# Patient Record
Sex: MALE | Race: WHITE | NOT HISPANIC OR LATINO | Employment: UNEMPLOYED | URBAN - METROPOLITAN AREA
[De-identification: names, ages, dates, MRNs, and addresses within clinical notes are randomized per-mention and may not be internally consistent; named-entity substitution may affect disease eponyms.]

---

## 2019-01-22 ENCOUNTER — OFFICE VISIT (OUTPATIENT)
Dept: ORTHOPEDICS | Facility: CLINIC | Age: 39
End: 2019-01-22

## 2019-01-22 ENCOUNTER — HOSPITAL ENCOUNTER (OUTPATIENT)
Dept: RADIOLOGY | Facility: HOSPITAL | Age: 39
Discharge: HOME | End: 2019-01-22
Attending: ORTHOPAEDIC SURGERY

## 2019-01-22 VITALS
HEIGHT: 74 IN | BODY MASS INDEX: 28.23 KG/M2 | SYSTOLIC BLOOD PRESSURE: 130 MMHG | DIASTOLIC BLOOD PRESSURE: 77 MMHG | HEART RATE: 85 BPM | WEIGHT: 220 LBS

## 2019-01-22 DIAGNOSIS — M25.572 LEFT ANKLE PAIN, UNSPECIFIED CHRONICITY: Primary | ICD-10-CM

## 2019-01-22 DIAGNOSIS — S93.492A SPRAIN OF ANTERIOR TALOFIBULAR LIGAMENT OF LEFT ANKLE, INITIAL ENCOUNTER: ICD-10-CM

## 2019-01-22 DIAGNOSIS — M25.572 LEFT ANKLE PAIN, UNSPECIFIED CHRONICITY: ICD-10-CM

## 2019-01-22 PROCEDURE — 73610 X-RAY EXAM OF ANKLE: CPT | Mod: 26,LT,, | Performed by: RADIOLOGY

## 2019-01-22 PROCEDURE — 99203 OFFICE O/P NEW LOW 30 MIN: CPT | Mod: PBBFAC,25,PN | Performed by: ORTHOPAEDIC SURGERY

## 2019-01-22 PROCEDURE — 97760 ORTHOTIC MGMT&TRAING 1ST ENC: CPT | Mod: ,,, | Performed by: ORTHOPAEDIC SURGERY

## 2019-01-22 PROCEDURE — 97760 PR ORTHOTIC MGMT&TRAINJ INITIAL ENC EA 15 MINS: ICD-10-PCS | Mod: ,,, | Performed by: ORTHOPAEDIC SURGERY

## 2019-01-22 PROCEDURE — 99999 PR PBB SHADOW E&M-NEW PATIENT-LVL III: CPT | Mod: PBBFAC,,, | Performed by: ORTHOPAEDIC SURGERY

## 2019-01-22 PROCEDURE — 99203 OFFICE O/P NEW LOW 30 MIN: CPT | Mod: S$PBB,,, | Performed by: ORTHOPAEDIC SURGERY

## 2019-01-22 PROCEDURE — 99203 PR OFFICE/OUTPT VISIT, NEW, LEVL III, 30-44 MIN: ICD-10-PCS | Mod: S$PBB,,, | Performed by: ORTHOPAEDIC SURGERY

## 2019-01-22 PROCEDURE — 99999 PR PBB SHADOW E&M-NEW PATIENT-LVL III: ICD-10-PCS | Mod: PBBFAC,,, | Performed by: ORTHOPAEDIC SURGERY

## 2019-01-22 PROCEDURE — 73610 X-RAY EXAM OF ANKLE: CPT | Mod: TC,PO,LT

## 2019-01-22 PROCEDURE — 73610 XR ANKLE COMPLETE 3 VIEW LEFT: ICD-10-PCS | Mod: 26,LT,, | Performed by: RADIOLOGY

## 2019-01-22 RX ORDER — CITALOPRAM 40 MG/1
TABLET, FILM COATED ORAL
Refills: 0 | COMMUNITY
Start: 2018-12-03

## 2019-01-22 RX ORDER — CLONAZEPAM 2 MG/1
TABLET ORAL
Refills: 0 | COMMUNITY
Start: 2019-01-08 | End: 2022-11-01

## 2019-01-22 RX ORDER — TRAZODONE HYDROCHLORIDE 50 MG/1
TABLET ORAL
Refills: 0 | COMMUNITY
Start: 2019-01-08 | End: 2022-11-01

## 2019-01-22 RX ORDER — CLONAZEPAM 1 MG/1
TABLET ORAL
Refills: 0 | COMMUNITY
Start: 2018-10-16 | End: 2022-11-01

## 2019-01-22 RX ORDER — GABAPENTIN 800 MG/1
TABLET ORAL
Refills: 4 | COMMUNITY
Start: 2019-01-08 | End: 2022-11-01

## 2019-01-22 RX ORDER — HYDROCODONE BITARTRATE AND ACETAMINOPHEN 5; 325 MG/1; MG/1
TABLET ORAL
Refills: 0 | COMMUNITY
Start: 2019-01-17 | End: 2022-11-01

## 2019-01-22 RX ORDER — IBUPROFEN 800 MG/1
800 TABLET ORAL 3 TIMES DAILY
Qty: 60 TABLET | Refills: 3 | Status: SHIPPED | OUTPATIENT
Start: 2019-01-22

## 2019-01-22 RX ORDER — BUPRENORPHINE HYDROCHLORIDE 8 MG/1
TABLET SUBLINGUAL
Refills: 0 | COMMUNITY
Start: 2019-01-01 | End: 2019-01-22
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
## 2019-02-11 DIAGNOSIS — S93.492A SPRAIN OF ANTERIOR TALOFIBULAR LIGAMENT OF LEFT ANKLE, INITIAL ENCOUNTER: Primary | ICD-10-CM

## 2019-03-20 ENCOUNTER — CLINICAL SUPPORT (OUTPATIENT)
Dept: URGENT CARE | Facility: CLINIC | Age: 39
End: 2019-03-20

## 2019-03-20 DIAGNOSIS — Z02.89 ENCOUNTER FOR PHYSICAL EXAMINATION RELATED TO EMPLOYMENT: Primary | ICD-10-CM

## 2019-04-10 NOTE — PROGRESS NOTES
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

## 2022-11-01 ENCOUNTER — OFFICE VISIT (OUTPATIENT)
Dept: FAMILY MEDICINE | Facility: CLINIC | Age: 42
End: 2022-11-01
Payer: COMMERCIAL

## 2022-11-01 VITALS
DIASTOLIC BLOOD PRESSURE: 84 MMHG | HEART RATE: 89 BPM | OXYGEN SATURATION: 95 % | HEIGHT: 74 IN | TEMPERATURE: 98 F | WEIGHT: 266.31 LBS | BODY MASS INDEX: 34.18 KG/M2 | SYSTOLIC BLOOD PRESSURE: 126 MMHG

## 2022-11-01 DIAGNOSIS — G47.30 SLEEP-RELATED BREATHING DISORDER: ICD-10-CM

## 2022-11-01 DIAGNOSIS — Z00.00 HEALTHCARE MAINTENANCE: Primary | ICD-10-CM

## 2022-11-01 DIAGNOSIS — R39.12 WEAK URINARY STREAM: ICD-10-CM

## 2022-11-01 PROCEDURE — 3079F DIAST BP 80-89 MM HG: CPT | Mod: CPTII,S$GLB,, | Performed by: INTERNAL MEDICINE

## 2022-11-01 PROCEDURE — 1160F PR REVIEW ALL MEDS BY PRESCRIBER/CLIN PHARMACIST DOCUMENTED: ICD-10-PCS | Mod: CPTII,S$GLB,, | Performed by: INTERNAL MEDICINE

## 2022-11-01 PROCEDURE — 1159F PR MEDICATION LIST DOCUMENTED IN MEDICAL RECORD: ICD-10-PCS | Mod: CPTII,S$GLB,, | Performed by: INTERNAL MEDICINE

## 2022-11-01 PROCEDURE — 1160F RVW MEDS BY RX/DR IN RCRD: CPT | Mod: CPTII,S$GLB,, | Performed by: INTERNAL MEDICINE

## 2022-11-01 PROCEDURE — 99204 PR OFFICE/OUTPT VISIT, NEW, LEVL IV, 45-59 MIN: ICD-10-PCS | Mod: S$GLB,,, | Performed by: INTERNAL MEDICINE

## 2022-11-01 PROCEDURE — 3074F PR MOST RECENT SYSTOLIC BLOOD PRESSURE < 130 MM HG: ICD-10-PCS | Mod: CPTII,S$GLB,, | Performed by: INTERNAL MEDICINE

## 2022-11-01 PROCEDURE — 99999 PR PBB SHADOW E&M-NEW PATIENT-LVL V: CPT | Mod: PBBFAC,,, | Performed by: INTERNAL MEDICINE

## 2022-11-01 PROCEDURE — 3074F SYST BP LT 130 MM HG: CPT | Mod: CPTII,S$GLB,, | Performed by: INTERNAL MEDICINE

## 2022-11-01 PROCEDURE — 99204 OFFICE O/P NEW MOD 45 MIN: CPT | Mod: S$GLB,,, | Performed by: INTERNAL MEDICINE

## 2022-11-01 PROCEDURE — 1159F MED LIST DOCD IN RCRD: CPT | Mod: CPTII,S$GLB,, | Performed by: INTERNAL MEDICINE

## 2022-11-01 PROCEDURE — 3079F PR MOST RECENT DIASTOLIC BLOOD PRESSURE 80-89 MM HG: ICD-10-PCS | Mod: CPTII,S$GLB,, | Performed by: INTERNAL MEDICINE

## 2022-11-01 PROCEDURE — 99999 PR PBB SHADOW E&M-NEW PATIENT-LVL V: ICD-10-PCS | Mod: PBBFAC,,, | Performed by: INTERNAL MEDICINE

## 2022-11-01 RX ORDER — BUPRENORPHINE HYDROCHLORIDE 8 MG/1
8 TABLET SUBLINGUAL 3 TIMES DAILY
COMMUNITY
Start: 2022-10-28

## 2022-11-01 RX ORDER — IBUPROFEN 100 MG/5ML
1000 SUSPENSION, ORAL (FINAL DOSE FORM) ORAL DAILY
COMMUNITY

## 2022-11-01 RX ORDER — ZINC GLUCONATE 50 MG
50 TABLET ORAL DAILY
COMMUNITY

## 2022-11-01 RX ORDER — OMEPRAZOLE 20 MG/1
20 CAPSULE, DELAYED RELEASE ORAL DAILY
COMMUNITY
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
## 2022-11-02 ENCOUNTER — LAB VISIT (OUTPATIENT)
Dept: LAB | Facility: HOSPITAL | Age: 42
End: 2022-11-02
Attending: INTERNAL MEDICINE
Payer: COMMERCIAL

## 2022-11-02 DIAGNOSIS — Z00.00 HEALTHCARE MAINTENANCE: ICD-10-CM

## 2022-11-02 LAB
25(OH)D3+25(OH)D2 SERPL-MCNC: 30 NG/ML (ref 30–96)
ALBUMIN SERPL BCP-MCNC: 4.6 G/DL (ref 3.5–5.2)
ALP SERPL-CCNC: 54 U/L (ref 55–135)
ALT SERPL W/O P-5'-P-CCNC: 41 U/L (ref 10–44)
ANION GAP SERPL CALC-SCNC: 10 MMOL/L (ref 8–16)
AST SERPL-CCNC: 27 U/L (ref 10–40)
BASOPHILS # BLD AUTO: 0.03 K/UL (ref 0–0.2)
BASOPHILS NFR BLD: 0.5 % (ref 0–1.9)
BILIRUB SERPL-MCNC: 1.1 MG/DL (ref 0.1–1)
BUN SERPL-MCNC: 21 MG/DL (ref 6–20)
CALCIUM SERPL-MCNC: 9.5 MG/DL (ref 8.7–10.5)
CHLORIDE SERPL-SCNC: 102 MMOL/L (ref 95–110)
CHOLEST SERPL-MCNC: 193 MG/DL (ref 120–199)
CHOLEST/HDLC SERPL: 4.7 {RATIO} (ref 2–5)
CO2 SERPL-SCNC: 30 MMOL/L (ref 23–29)
CREAT SERPL-MCNC: 0.8 MG/DL (ref 0.5–1.4)
DIFFERENTIAL METHOD: NORMAL
EOSINOPHIL # BLD AUTO: 0.1 K/UL (ref 0–0.5)
EOSINOPHIL NFR BLD: 1.4 % (ref 0–8)
ERYTHROCYTE [DISTWIDTH] IN BLOOD BY AUTOMATED COUNT: 11.7 % (ref 11.5–14.5)
EST. GFR  (NO RACE VARIABLE): >60 ML/MIN/1.73 M^2
ESTIMATED AVG GLUCOSE: 100 MG/DL (ref 68–131)
GLUCOSE SERPL-MCNC: 109 MG/DL (ref 70–110)
HAV IGM SERPL QL IA: NORMAL
HBA1C MFR BLD: 5.1 % (ref 4–5.6)
HBV CORE IGM SERPL QL IA: NORMAL
HBV SURFACE AG SERPL QL IA: NORMAL
HCT VFR BLD AUTO: 41 % (ref 40–54)
HCV AB SERPL QL IA: NORMAL
HCV AB SERPL QL IA: NORMAL
HDLC SERPL-MCNC: 41 MG/DL (ref 40–75)
HDLC SERPL: 21.2 % (ref 20–50)
HGB BLD-MCNC: 14.4 G/DL (ref 14–18)
HIV 1+2 AB+HIV1 P24 AG SERPL QL IA: NORMAL
IMM GRANULOCYTES # BLD AUTO: 0.02 K/UL (ref 0–0.04)
IMM GRANULOCYTES NFR BLD AUTO: 0.4 % (ref 0–0.5)
LDLC SERPL CALC-MCNC: 113.4 MG/DL (ref 63–159)
LYMPHOCYTES # BLD AUTO: 1.3 K/UL (ref 1–4.8)
LYMPHOCYTES NFR BLD: 23.6 % (ref 18–48)
MCH RBC QN AUTO: 30.7 PG (ref 27–31)
MCHC RBC AUTO-ENTMCNC: 35.1 G/DL (ref 32–36)
MCV RBC AUTO: 87 FL (ref 82–98)
MONOCYTES # BLD AUTO: 0.3 K/UL (ref 0.3–1)
MONOCYTES NFR BLD: 5.8 % (ref 4–15)
NEUTROPHILS # BLD AUTO: 3.8 K/UL (ref 1.8–7.7)
NEUTROPHILS NFR BLD: 68.3 % (ref 38–73)
NONHDLC SERPL-MCNC: 152 MG/DL
NRBC BLD-RTO: 0 /100 WBC
PLATELET # BLD AUTO: 195 K/UL (ref 150–450)
PMV BLD AUTO: 11.3 FL (ref 9.2–12.9)
POTASSIUM SERPL-SCNC: 4.1 MMOL/L (ref 3.5–5.1)
PROT SERPL-MCNC: 8.1 G/DL (ref 6–8.4)
RBC # BLD AUTO: 4.69 M/UL (ref 4.6–6.2)
SODIUM SERPL-SCNC: 142 MMOL/L (ref 136–145)
TRIGL SERPL-MCNC: 193 MG/DL (ref 30–150)
TSH SERPL DL<=0.005 MIU/L-ACNC: 2.78 UIU/ML (ref 0.4–4)
WBC # BLD AUTO: 5.56 K/UL (ref 3.9–12.7)

## 2022-11-02 PROCEDURE — 80061 LIPID PANEL: CPT | Performed by: INTERNAL MEDICINE

## 2022-11-02 PROCEDURE — 80074 ACUTE HEPATITIS PANEL: CPT | Performed by: INTERNAL MEDICINE

## 2022-11-02 PROCEDURE — 87389 HIV-1 AG W/HIV-1&-2 AB AG IA: CPT | Performed by: INTERNAL MEDICINE

## 2022-11-02 PROCEDURE — 80053 COMPREHEN METABOLIC PANEL: CPT | Performed by: INTERNAL MEDICINE

## 2022-11-02 PROCEDURE — 85025 COMPLETE CBC W/AUTO DIFF WBC: CPT | Performed by: INTERNAL MEDICINE

## 2022-11-02 PROCEDURE — 82306 VITAMIN D 25 HYDROXY: CPT | Performed by: INTERNAL MEDICINE

## 2022-11-02 PROCEDURE — 36415 COLL VENOUS BLD VENIPUNCTURE: CPT | Mod: PN | Performed by: INTERNAL MEDICINE

## 2022-11-02 PROCEDURE — 84443 ASSAY THYROID STIM HORMONE: CPT | Performed by: INTERNAL MEDICINE

## 2022-11-02 PROCEDURE — 86592 SYPHILIS TEST NON-TREP QUAL: CPT | Performed by: INTERNAL MEDICINE

## 2022-11-02 PROCEDURE — 83036 HEMOGLOBIN GLYCOSYLATED A1C: CPT | Performed by: INTERNAL MEDICINE

## 2022-11-03 ENCOUNTER — TELEPHONE (OUTPATIENT)
Dept: FAMILY MEDICINE | Facility: CLINIC | Age: 42
End: 2022-11-03
Payer: COMMERCIAL

## 2022-11-03 LAB — RPR SER QL: NORMAL

## 2022-11-03 NOTE — TELEPHONE ENCOUNTER
----- Message from Higinio Stokes MD sent at 11/3/2022 10:27 AM CDT -----  Please call patient to notify him that urine culture is without growth of bacteria so far.  Kidney and liver test normal.  There is no evidence of diabetes as A1C lab in normal.  Gonorrhea/chlamydia negative.  Urine test without major abnormalities.  Cholesterol was mildly elevated.  Work on healthy eating, regular exercise, and eating enough fiber to manage this.  Other results are normal.    We can discuss in more detail at his next visit.

## 2023-02-14 ENCOUNTER — OFFICE VISIT (OUTPATIENT)
Dept: PULMONOLOGY | Facility: CLINIC | Age: 43
End: 2023-02-14
Payer: COMMERCIAL

## 2023-02-14 VITALS
WEIGHT: 278.25 LBS | OXYGEN SATURATION: 97 % | HEART RATE: 86 BPM | BODY MASS INDEX: 35.71 KG/M2 | SYSTOLIC BLOOD PRESSURE: 147 MMHG | DIASTOLIC BLOOD PRESSURE: 101 MMHG | HEIGHT: 74 IN

## 2023-02-14 DIAGNOSIS — G47.30 SLEEP-RELATED BREATHING DISORDER: ICD-10-CM

## 2023-02-14 DIAGNOSIS — G47.33 OSA (OBSTRUCTIVE SLEEP APNEA): ICD-10-CM

## 2023-02-14 DIAGNOSIS — R09.81 NASAL CONGESTION: ICD-10-CM

## 2023-02-14 PROCEDURE — 99203 PR OFFICE/OUTPT VISIT, NEW, LEVL III, 30-44 MIN: ICD-10-PCS | Mod: S$GLB,,, | Performed by: INTERNAL MEDICINE

## 2023-02-14 PROCEDURE — 3077F SYST BP >= 140 MM HG: CPT | Mod: CPTII,S$GLB,, | Performed by: INTERNAL MEDICINE

## 2023-02-14 PROCEDURE — 3077F PR MOST RECENT SYSTOLIC BLOOD PRESSURE >= 140 MM HG: ICD-10-PCS | Mod: CPTII,S$GLB,, | Performed by: INTERNAL MEDICINE

## 2023-02-14 PROCEDURE — 3080F PR MOST RECENT DIASTOLIC BLOOD PRESSURE >= 90 MM HG: ICD-10-PCS | Mod: CPTII,S$GLB,, | Performed by: INTERNAL MEDICINE

## 2023-02-14 PROCEDURE — 1159F MED LIST DOCD IN RCRD: CPT | Mod: CPTII,S$GLB,, | Performed by: INTERNAL MEDICINE

## 2023-02-14 PROCEDURE — 3008F BODY MASS INDEX DOCD: CPT | Mod: CPTII,S$GLB,, | Performed by: INTERNAL MEDICINE

## 2023-02-14 PROCEDURE — 1159F PR MEDICATION LIST DOCUMENTED IN MEDICAL RECORD: ICD-10-PCS | Mod: CPTII,S$GLB,, | Performed by: INTERNAL MEDICINE

## 2023-02-14 PROCEDURE — 3008F PR BODY MASS INDEX (BMI) DOCUMENTED: ICD-10-PCS | Mod: CPTII,S$GLB,, | Performed by: INTERNAL MEDICINE

## 2023-02-14 PROCEDURE — 99999 PR PBB SHADOW E&M-EST. PATIENT-LVL V: ICD-10-PCS | Mod: PBBFAC,,, | Performed by: INTERNAL MEDICINE

## 2023-02-14 PROCEDURE — 3080F DIAST BP >= 90 MM HG: CPT | Mod: CPTII,S$GLB,, | Performed by: INTERNAL MEDICINE

## 2023-02-14 PROCEDURE — 99203 OFFICE O/P NEW LOW 30 MIN: CPT | Mod: S$GLB,,, | Performed by: INTERNAL MEDICINE

## 2023-02-14 PROCEDURE — 99999 PR PBB SHADOW E&M-EST. PATIENT-LVL V: CPT | Mod: PBBFAC,,, | Performed by: INTERNAL MEDICINE

## 2023-02-14 NOTE — PATIENT INSTRUCTIONS
Your provider has scheduled you a Sleep Study    What you need to know:    This referral will be sent to your insurance for authorization.  Depending on your insurance company, this process may take two weeks to be authorized.    Once your study has been authorized, Some one from the sleep lab will contact you to schedule your sleep study.   Their number is [phone]. The [facility] is located on 2nd floor of [facility].    We will call you when the sleep study results are ready - if you have not heard from us by 2 weeks from the date of the study, please call [phone].    For information regarding financial obligations, please call [employer] at [phone].    If you study is already scheduled, please call [phone].    Once your study has been completed, it will take up to 14 business days for the results to be sent back to your provider.    If you have any questions you can send a message through your [facility] account, or call the clinic at [phone].    You are advised to abstain from driving should you feel sleepy or drowsy.  1.  NeilMed Sinus Rinse Regular Kit    Recipe 1/4 teaspoon of salt and 1/4 teaspoon of baking soda in distilled water.  Be sure to tilt head forward as shown in picture.          flonase or nasonex over the counter.  2 sprays per nostril daily. Be sure to tilt head forward when dispensing medication.

## 2023-02-14 NOTE — ASSESSMENT & PLAN NOTE
The patient symptomatically has loud snoring, daytime somnolence with findings of enlarged neck, high grade mallampatti. This warrants further investigation for possible obstructive sleep apnea.  Patient will be contacted after sleep study is done.
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
## 2024-01-16 ENCOUNTER — HOSPITAL ENCOUNTER (EMERGENCY)
Facility: HOSPITAL | Age: 44
Discharge: HOME | End: 2024-01-16
Attending: EMERGENCY MEDICINE
Payer: COMMERCIAL

## 2024-01-16 VITALS
RESPIRATION RATE: 16 BRPM | DIASTOLIC BLOOD PRESSURE: 63 MMHG | HEIGHT: 74 IN | HEART RATE: 74 BPM | TEMPERATURE: 98 F | OXYGEN SATURATION: 96 % | WEIGHT: 275 LBS | SYSTOLIC BLOOD PRESSURE: 134 MMHG | BODY MASS INDEX: 35.29 KG/M2

## 2024-01-16 DIAGNOSIS — F41.9 ANXIETY: Primary | ICD-10-CM

## 2024-01-16 DIAGNOSIS — R06.02 SOB (SHORTNESS OF BREATH): ICD-10-CM

## 2024-01-16 LAB
ALBUMIN SERPL BCP-MCNC: 4.4 G/DL (ref 3.5–5.2)
ALP SERPL-CCNC: 58 U/L (ref 55–135)
ALT SERPL W/O P-5'-P-CCNC: 93 U/L (ref 10–44)
ANION GAP SERPL CALC-SCNC: 7 MMOL/L (ref 8–16)
AST SERPL-CCNC: 45 U/L (ref 10–40)
BASOPHILS # BLD AUTO: 0.02 K/UL (ref 0–0.2)
BASOPHILS NFR BLD: 0.3 % (ref 0–1.9)
BILIRUB SERPL-MCNC: 0.9 MG/DL (ref 0.1–1)
BNP SERPL-MCNC: <10 PG/ML (ref 0–99)
BUN SERPL-MCNC: 17 MG/DL (ref 6–20)
CALCIUM SERPL-MCNC: 9.8 MG/DL (ref 8.7–10.5)
CHLORIDE SERPL-SCNC: 103 MMOL/L (ref 95–110)
CO2 SERPL-SCNC: 29 MMOL/L (ref 23–29)
CREAT SERPL-MCNC: 0.9 MG/DL (ref 0.5–1.4)
DIFFERENTIAL METHOD BLD: NORMAL
EOSINOPHIL # BLD AUTO: 0.1 K/UL (ref 0–0.5)
EOSINOPHIL NFR BLD: 1.4 % (ref 0–8)
ERYTHROCYTE [DISTWIDTH] IN BLOOD BY AUTOMATED COUNT: 11.6 % (ref 11.5–14.5)
EST. GFR  (NO RACE VARIABLE): >60 ML/MIN/1.73 M^2
GLUCOSE SERPL-MCNC: 97 MG/DL (ref 70–110)
HCT VFR BLD AUTO: 40 % (ref 40–54)
HGB BLD-MCNC: 14 G/DL (ref 14–18)
IMM GRANULOCYTES # BLD AUTO: 0.01 K/UL (ref 0–0.04)
IMM GRANULOCYTES NFR BLD AUTO: 0.2 % (ref 0–0.5)
LYMPHOCYTES # BLD AUTO: 1.7 K/UL (ref 1–4.8)
LYMPHOCYTES NFR BLD: 29.2 % (ref 18–48)
MCH RBC QN AUTO: 29.7 PG (ref 27–31)
MCHC RBC AUTO-ENTMCNC: 35 G/DL (ref 32–36)
MCV RBC AUTO: 85 FL (ref 82–98)
MONOCYTES # BLD AUTO: 0.4 K/UL (ref 0.3–1)
MONOCYTES NFR BLD: 6.3 % (ref 4–15)
NEUTROPHILS # BLD AUTO: 3.7 K/UL (ref 1.8–7.7)
NEUTROPHILS NFR BLD: 62.6 % (ref 38–73)
NRBC BLD-RTO: 0 /100 WBC
PLATELET # BLD AUTO: 175 K/UL (ref 150–450)
PMV BLD AUTO: 10.7 FL (ref 9.2–12.9)
POTASSIUM SERPL-SCNC: 4.5 MMOL/L (ref 3.5–5.1)
PROT SERPL-MCNC: 7.5 G/DL (ref 6–8.4)
RBC # BLD AUTO: 4.72 M/UL (ref 4.6–6.2)
SODIUM SERPL-SCNC: 139 MMOL/L (ref 136–145)
TROPONIN I SERPL DL<=0.01 NG/ML-MCNC: <0.006 NG/ML (ref 0–0.03)
WBC # BLD AUTO: 5.9 K/UL (ref 3.9–12.7)

## 2024-01-16 PROCEDURE — 93005 ELECTROCARDIOGRAM TRACING: CPT

## 2024-01-16 PROCEDURE — 84484 ASSAY OF TROPONIN QUANT: CPT | Performed by: EMERGENCY MEDICINE

## 2024-01-16 PROCEDURE — 99285 EMERGENCY DEPT VISIT HI MDM: CPT | Mod: 25

## 2024-01-16 PROCEDURE — 93010 ELECTROCARDIOGRAM REPORT: CPT | Mod: ,,, | Performed by: INTERNAL MEDICINE

## 2024-01-16 PROCEDURE — 80053 COMPREHEN METABOLIC PANEL: CPT | Performed by: EMERGENCY MEDICINE

## 2024-01-16 PROCEDURE — 83880 ASSAY OF NATRIURETIC PEPTIDE: CPT | Performed by: EMERGENCY MEDICINE

## 2024-01-16 PROCEDURE — 85025 COMPLETE CBC W/AUTO DIFF WBC: CPT | Performed by: EMERGENCY MEDICINE

## 2024-01-16 NOTE — DISCHARGE INSTRUCTIONS

## 2024-01-16 NOTE — Clinical Note
"Daniel "Carmine Jaeger was seen and treated in our emergency department on 1/16/2024.  He may return to work on 01/17/2024.       If you have any questions or concerns, please don't hesitate to call.      Marco Quach MD"

## 2024-01-16 NOTE — ED NOTES
"Daniel Jaeger, a 43 y.o. male presents to the ED w/ complaint of SOB. Patient reports recently running out of his prescribed celexa and "feeling weird since". Patient states that his SOB has resolved.     "
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
## 2024-07-18 ENCOUNTER — TELEPHONE (OUTPATIENT)
Dept: FAMILY MEDICINE | Facility: CLINIC | Age: 44
End: 2024-07-18
Payer: COMMERCIAL

## 2024-07-18 NOTE — TELEPHONE ENCOUNTER
----- Message from Amy Colunga sent at 7/18/2024  2:26 PM CDT -----  Regarding: Self .[phone]  Type: Patient requesting referral     Who Called: Self     Referral to What Specialty: urology    Reason for Referral: vasectomy      Does the patient want the referral with a specific physician?: any    [facility] the [facility]     Would the patient rather a call back or a response via [facility] ? Calll back      Best Call Back Number: .[phone]      Additional Information:

## 2025-02-10 ENCOUNTER — OFFICE VISIT (OUTPATIENT)
Dept: FAMILY MEDICINE | Facility: CLINIC | Age: 45
End: 2025-02-10
Payer: COMMERCIAL

## 2025-02-10 VITALS
HEART RATE: 77 BPM | BODY MASS INDEX: 35.34 KG/M2 | HEIGHT: 74 IN | OXYGEN SATURATION: 95 % | WEIGHT: 275.38 LBS | SYSTOLIC BLOOD PRESSURE: 118 MMHG | DIASTOLIC BLOOD PRESSURE: 78 MMHG | TEMPERATURE: 99 F

## 2025-02-10 DIAGNOSIS — F11.21 OPIOID DEPENDENCE IN REMISSION: ICD-10-CM

## 2025-02-10 DIAGNOSIS — I10 ESSENTIAL HYPERTENSION: ICD-10-CM

## 2025-02-10 DIAGNOSIS — E66.01 CLASS 2 SEVERE OBESITY DUE TO EXCESS CALORIES WITH SERIOUS COMORBIDITY AND BODY MASS INDEX (BMI) OF 35.0 TO 35.9 IN ADULT: ICD-10-CM

## 2025-02-10 DIAGNOSIS — Z30.09 VASECTOMY EVALUATION: Primary | ICD-10-CM

## 2025-02-10 DIAGNOSIS — E66.812 CLASS 2 SEVERE OBESITY DUE TO EXCESS CALORIES WITH SERIOUS COMORBIDITY AND BODY MASS INDEX (BMI) OF 35.0 TO 35.9 IN ADULT: ICD-10-CM

## 2025-02-10 DIAGNOSIS — G47.33 OSA (OBSTRUCTIVE SLEEP APNEA): ICD-10-CM

## 2025-02-10 PROCEDURE — 99999 PR PBB SHADOW E&M-EST. PATIENT-LVL V: CPT | Mod: PBBFAC,,, | Performed by: INTERNAL MEDICINE

## 2025-02-10 PROCEDURE — 1159F MED LIST DOCD IN RCRD: CPT | Mod: CPTII,S$GLB,, | Performed by: INTERNAL MEDICINE

## 2025-02-10 PROCEDURE — 99214 OFFICE O/P EST MOD 30 MIN: CPT | Mod: S$GLB,,, | Performed by: INTERNAL MEDICINE

## 2025-02-10 PROCEDURE — 3078F DIAST BP <80 MM HG: CPT | Mod: CPTII,S$GLB,, | Performed by: INTERNAL MEDICINE

## 2025-02-10 PROCEDURE — 1160F RVW MEDS BY RX/DR IN RCRD: CPT | Mod: CPTII,S$GLB,, | Performed by: INTERNAL MEDICINE

## 2025-02-10 PROCEDURE — 3008F BODY MASS INDEX DOCD: CPT | Mod: CPTII,S$GLB,, | Performed by: INTERNAL MEDICINE

## 2025-02-10 PROCEDURE — 3074F SYST BP LT 130 MM HG: CPT | Mod: CPTII,S$GLB,, | Performed by: INTERNAL MEDICINE

## 2025-02-10 RX ORDER — AMLODIPINE BESYLATE 5 MG/1
5 TABLET ORAL DAILY
COMMUNITY
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
## 2025-02-24 ENCOUNTER — OFFICE VISIT (OUTPATIENT)
Dept: UROLOGY | Facility: CLINIC | Age: 45
End: 2025-02-24
Payer: COMMERCIAL

## 2025-02-24 VITALS
DIASTOLIC BLOOD PRESSURE: 79 MMHG | SYSTOLIC BLOOD PRESSURE: 115 MMHG | WEIGHT: 275 LBS | HEART RATE: 82 BPM | HEIGHT: 74 IN | BODY MASS INDEX: 35.29 KG/M2

## 2025-02-24 DIAGNOSIS — Z30.09 VASECTOMY EVALUATION: ICD-10-CM

## 2025-02-24 DIAGNOSIS — Z30.09 VISIT FOR VASECTOMY EVALUATION: Primary | ICD-10-CM

## 2025-02-24 PROCEDURE — 3008F BODY MASS INDEX DOCD: CPT | Mod: CPTII,S$GLB,, | Performed by: UROLOGY

## 2025-02-24 PROCEDURE — 99999 PR PBB SHADOW E&M-EST. PATIENT-LVL III: CPT | Mod: PBBFAC,,, | Performed by: UROLOGY

## 2025-02-24 PROCEDURE — 99204 OFFICE O/P NEW MOD 45 MIN: CPT | Mod: S$GLB,,, | Performed by: UROLOGY

## 2025-02-24 PROCEDURE — 3078F DIAST BP <80 MM HG: CPT | Mod: CPTII,S$GLB,, | Performed by: UROLOGY

## 2025-02-24 PROCEDURE — 3074F SYST BP LT 130 MM HG: CPT | Mod: CPTII,S$GLB,, | Performed by: UROLOGY

## 2025-02-24 PROCEDURE — 1159F MED LIST DOCD IN RCRD: CPT | Mod: CPTII,S$GLB,, | Performed by: UROLOGY

## 2025-02-24 RX ORDER — CETIRIZINE HYDROCHLORIDE, PSEUDOEPHEDRINE HYDROCHLORIDE 5; 120 MG/1; MG/1
TABLET, FILM COATED, EXTENDED RELEASE ORAL
COMMUNITY

## 2025-02-24 RX ORDER — CITALOPRAM 20 MG/1
1 TABLET, FILM COATED ORAL DAILY
COMMUNITY

## 2025-02-24 NOTE — PROGRESS NOTES
Procedure Order to Urology [[id number]]    Electronically signed by: Mika Mak MD on 02/24/25 1019 Status: Active   Ordering user: Mika Mak MD 02/24/25 1019 Authorized by: Mika Mak MD   Ordering mode: Standard   Frequency:  02/24/25 -     Diagnoses  Visit for vasectomy evaluation [Z30.09]   Questionnaire    Question Answer   Procedure Vasectomy Comment - 3/20 or thurs   Facility Name: [facility] patient [facility], Please order IV, Ancef 2 gram ( alternative for PCN allergy is Cipro 400mg IV ) General Anesthesia.

## 2025-02-25 RX ORDER — CIPROFLOXACIN 2 MG/ML
400 INJECTION, SOLUTION INTRAVENOUS
OUTPATIENT
Start: 2025-02-25

## 2025-03-10 ENCOUNTER — TELEPHONE (OUTPATIENT)
Dept: UROLOGY | Facility: CLINIC | Age: 45
End: 2025-03-10
Payer: COMMERCIAL

## 2025-03-10 NOTE — TELEPHONE ENCOUNTER
Called pt to offer resched vas for awake with Dr Solorio   Pt declined and voiced he wanted to  reschedule for 5/2025  Informed pt will call back regarding reschedule